# Patient Record
Sex: FEMALE | Race: WHITE | NOT HISPANIC OR LATINO | URBAN - METROPOLITAN AREA
[De-identification: names, ages, dates, MRNs, and addresses within clinical notes are randomized per-mention and may not be internally consistent; named-entity substitution may affect disease eponyms.]

---

## 2020-12-23 ENCOUNTER — OFFICE VISIT (OUTPATIENT)
Dept: URBAN - METROPOLITAN AREA CLINIC 113 | Facility: CLINIC | Age: 45
End: 2020-12-23

## 2021-01-21 ENCOUNTER — OFFICE VISIT (OUTPATIENT)
Dept: URBAN - METROPOLITAN AREA CLINIC 113 | Facility: CLINIC | Age: 46
End: 2021-01-21

## 2021-01-21 NOTE — HPI-TODAY'S VISIT:
This is a 45-year-old female with a history of anxiety, migraines, chronic back pain, and GERD referred from Dr. Davis for evaluation of nausea. EGD 9/25/2020 (Dr. 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  Biopsies of the antrum and GE junction were obtained.  Pathology report is unavailable.

## 2021-03-18 ENCOUNTER — OFFICE VISIT (OUTPATIENT)
Dept: URBAN - METROPOLITAN AREA CLINIC 113 | Facility: CLINIC | Age: 46
End: 2021-03-18

## 2021-04-09 ENCOUNTER — WEB ENCOUNTER (OUTPATIENT)
Dept: URBAN - METROPOLITAN AREA CLINIC 107 | Facility: CLINIC | Age: 46
End: 2021-04-09

## 2021-04-09 ENCOUNTER — OFFICE VISIT (OUTPATIENT)
Dept: URBAN - METROPOLITAN AREA CLINIC 107 | Facility: CLINIC | Age: 46
End: 2021-04-09
Payer: MEDICARE

## 2021-04-09 VITALS
SYSTOLIC BLOOD PRESSURE: 120 MMHG | BODY MASS INDEX: 36.65 KG/M2 | HEIGHT: 65 IN | DIASTOLIC BLOOD PRESSURE: 100 MMHG | RESPIRATION RATE: 20 BRPM | HEART RATE: 60 BPM | TEMPERATURE: 98.1 F | WEIGHT: 220 LBS

## 2021-04-09 DIAGNOSIS — K59.01 SLOW TRANSIT CONSTIPATION: ICD-10-CM

## 2021-04-09 DIAGNOSIS — R11.2 NON-INTRACTABLE VOMITING WITH NAUSEA, UNSPECIFIED VOMITING TYPE: ICD-10-CM

## 2021-04-09 DIAGNOSIS — R10.13 EPIGASTRIC PAIN: ICD-10-CM

## 2021-04-09 PROCEDURE — 99244 OFF/OP CNSLTJ NEW/EST MOD 40: CPT | Performed by: INTERNAL MEDICINE

## 2021-04-09 PROCEDURE — 99204 OFFICE O/P NEW MOD 45 MIN: CPT | Performed by: INTERNAL MEDICINE

## 2021-04-09 RX ORDER — OXYCODONE 18 MG/1
1 CAPSULE WITH FOOD CAPSULE, EXTENDED RELEASE ORAL
Status: ACTIVE | COMMUNITY

## 2021-04-09 RX ORDER — HYDROCODONE BITARTRATE AND ACETAMINOPHEN 10; 325 MG/1; MG/1
1 TABLET AS NEEDED TABLET ORAL
Status: ACTIVE | COMMUNITY

## 2021-04-09 RX ORDER — METOCLOPRAMIDE 10 MG/1
1 TABLET BEFORE MEALS TABLET ORAL TWICE A DAY
Status: ACTIVE | COMMUNITY

## 2021-04-09 RX ORDER — HYDROCORTISONE 25 MG/G
1 APPLICATION CREAM TOPICAL ONCE A DAY
Status: ACTIVE | COMMUNITY

## 2021-04-09 RX ORDER — URSODIOL 250 MG/1
1 TABLET WITH FOOD TABLET ORAL TWICE A DAY
Status: ACTIVE | COMMUNITY

## 2021-04-09 RX ORDER — CYCLOBENZAPRINE HYDROCHLORIDE 10 MG/1
1 TABLET AT BEDTIME AS NEEDED TABLET, FILM COATED ORAL ONCE A DAY
Status: ACTIVE | COMMUNITY

## 2021-04-09 RX ORDER — MONTELUKAST SODIUM 10 MG/1
1 TABLET TABLET, FILM COATED ORAL ONCE A DAY
Status: ACTIVE | COMMUNITY

## 2021-04-09 RX ORDER — PRUCALOPRIDE 2 MG/1
1 TABLET TABLET, FILM COATED ORAL ONCE A DAY
Qty: 30 | OUTPATIENT
Start: 2021-04-09 | End: 2021-05-09

## 2021-04-09 RX ORDER — TIZANIDINE 4 MG/1
1 TABLET AS NEEDED TABLET ORAL THREE TIMES A DAY
Status: ACTIVE | COMMUNITY

## 2021-04-09 RX ORDER — PANTOPRAZOLE SODIUM 40 MG/1
1 TABLET TABLET, DELAYED RELEASE ORAL ONCE A DAY
Status: ACTIVE | COMMUNITY

## 2021-04-09 RX ORDER — DIAZEPAM 10 MG/2ML
AS DIRECTED GEL RECTAL
Status: ACTIVE | COMMUNITY

## 2021-04-09 RX ORDER — GABAPENTIN 800 MG/1
1 TABLET TABLET, FILM COATED ORAL ONCE A DAY
Status: ACTIVE | COMMUNITY

## 2021-04-09 RX ORDER — FLUOCINONIDE 0.5 MG/G
1 APPLICATION CREAM TOPICAL TWICE A DAY
Status: ACTIVE | COMMUNITY

## 2021-04-09 RX ORDER — CHLORHEXIDINE GLUCONATE 1.2 MG/ML
AS DIRECTED RINSE ORAL
Status: ACTIVE | COMMUNITY

## 2021-04-09 RX ORDER — TOPIRAMATE 100 MG/1
1 CAPSULE CAPSULE, EXTENDED RELEASE ORAL ONCE A DAY
Status: ACTIVE | COMMUNITY

## 2021-04-09 RX ORDER — PROMETHAZINE HYDROCHLORIDE 12.5 MG/1
1 TABLET AS NEEDED TABLET ORAL
Status: ACTIVE | COMMUNITY

## 2021-04-09 RX ORDER — DICLOFENAC SODIUM TOPICAL GEL, 1% 10 MG/G
AS DIRECTED GEL TOPICAL
Status: ACTIVE | COMMUNITY

## 2021-04-09 RX ORDER — FUROSEMIDE 40 MG/1
1 TABLET TABLET ORAL ONCE A DAY
Status: ACTIVE | COMMUNITY

## 2021-04-09 RX ORDER — ROPINIROLE 4 MG/1
1 TABLET 1 TO 3 HOURS BEFORE BEDTIME TABLET, FILM COATED ORAL ONCE A DAY
Status: ACTIVE | COMMUNITY

## 2021-04-09 NOTE — HPI-TODAY'S VISIT:
The patient was referred by Dr. David Davis for nausea.   A copy of this document is being forwarded to the referring provider.  44 y/o female with nausea. She has postprandial bloating and pain. She had an upper endoscopy in 9/2020 which showed a hiatal hernia and reflux at the GEJ. Bx were negative. She had a normal GES in 5/2020. She does use chronic narcotics.  She has had GI issues for about 4 years. She was seen by Dr. Hobbs (which did her EGD). She was also evaluated by Dr. Scott in the past and had an EGD and GES/Barium swallow as well. This is how she eventually met Dr. Davis for evaluation of surgery.   Her symptoms are typically early satiety and nausea for the most part. She does sometimes have constipation and has a bowel movement every few days typically.  She has been on pain meds since about age [age].

## 2021-04-14 ENCOUNTER — TELEPHONE ENCOUNTER (OUTPATIENT)
Dept: URBAN - METROPOLITAN AREA CLINIC 113 | Facility: CLINIC | Age: 46
End: 2021-04-14

## 2021-06-08 ENCOUNTER — OFFICE VISIT (OUTPATIENT)
Dept: URBAN - METROPOLITAN AREA CLINIC 113 | Facility: CLINIC | Age: 46
End: 2021-06-08

## 2021-06-11 ENCOUNTER — OFFICE VISIT (OUTPATIENT)
Dept: URBAN - METROPOLITAN AREA CLINIC 113 | Facility: CLINIC | Age: 46
End: 2021-06-11

## 2021-06-15 ENCOUNTER — TELEPHONE ENCOUNTER (OUTPATIENT)
Dept: URBAN - METROPOLITAN AREA CLINIC 113 | Facility: CLINIC | Age: 46
End: 2021-06-15

## 2021-06-24 ENCOUNTER — WEB ENCOUNTER (OUTPATIENT)
Dept: URBAN - METROPOLITAN AREA CLINIC 113 | Facility: CLINIC | Age: 46
End: 2021-06-24

## 2021-06-24 ENCOUNTER — LAB OUTSIDE AN ENCOUNTER (OUTPATIENT)
Dept: URBAN - METROPOLITAN AREA CLINIC 113 | Facility: CLINIC | Age: 46
End: 2021-06-24

## 2021-06-24 ENCOUNTER — OFFICE VISIT (OUTPATIENT)
Dept: URBAN - METROPOLITAN AREA CLINIC 113 | Facility: CLINIC | Age: 46
End: 2021-06-24
Payer: MEDICARE

## 2021-06-24 VITALS
RESPIRATION RATE: 18 BRPM | HEIGHT: 65 IN | WEIGHT: 205 LBS | BODY MASS INDEX: 34.16 KG/M2 | DIASTOLIC BLOOD PRESSURE: 86 MMHG | SYSTOLIC BLOOD PRESSURE: 130 MMHG | HEART RATE: 79 BPM | TEMPERATURE: 97.7 F

## 2021-06-24 DIAGNOSIS — K59.01 SLOW TRANSIT CONSTIPATION: ICD-10-CM

## 2021-06-24 DIAGNOSIS — K21.9 GASTROESOPHAGEAL REFLUX DISEASE, UNSPECIFIED WHETHER ESOPHAGITIS PRESENT: ICD-10-CM

## 2021-06-24 DIAGNOSIS — R13.10 ESOPHAGEAL DYSPHAGIA: ICD-10-CM

## 2021-06-24 DIAGNOSIS — R10.13 EPIGASTRIC PAIN: ICD-10-CM

## 2021-06-24 DIAGNOSIS — R11.2 NON-INTRACTABLE VOMITING WITH NAUSEA, UNSPECIFIED VOMITING TYPE: ICD-10-CM

## 2021-06-24 DIAGNOSIS — K62.5 BRIGHT RED BLOOD PER RECTUM: ICD-10-CM

## 2021-06-24 PROCEDURE — 99214 OFFICE O/P EST MOD 30 MIN: CPT | Performed by: INTERNAL MEDICINE

## 2021-06-24 RX ORDER — TOPIRAMATE 100 MG/1
1 CAPSULE CAPSULE, EXTENDED RELEASE ORAL ONCE A DAY
Status: ACTIVE | COMMUNITY

## 2021-06-24 RX ORDER — DICLOFENAC SODIUM TOPICAL GEL, 1% 10 MG/G
AS DIRECTED GEL TOPICAL
Status: ACTIVE | COMMUNITY

## 2021-06-24 RX ORDER — LINACLOTIDE 145 UG/1
1 CAPSULE AT LEAST 30 MINUTES BEFORE THE FIRST MEAL OF THE DAY ON AN EMPTY STOMACH CAPSULE, GELATIN COATED ORAL ONCE A DAY
Qty: 30 | Refills: 3 | OUTPATIENT

## 2021-06-24 RX ORDER — ROPINIROLE 4 MG/1
1 TABLET 1 TO 3 HOURS BEFORE BEDTIME TABLET, FILM COATED ORAL ONCE A DAY
Status: ACTIVE | COMMUNITY

## 2021-06-24 RX ORDER — TIZANIDINE 4 MG/1
1 TABLET AS NEEDED TABLET ORAL THREE TIMES A DAY
Status: ON HOLD | COMMUNITY

## 2021-06-24 RX ORDER — HYDROCODONE BITARTRATE AND ACETAMINOPHEN 10; 325 MG/1; MG/1
1 TABLET AS NEEDED TABLET ORAL
Status: ACTIVE | COMMUNITY

## 2021-06-24 RX ORDER — HYDROCORTISONE 25 MG/G
1 APPLICATION CREAM TOPICAL ONCE A DAY
Status: ON HOLD | COMMUNITY

## 2021-06-24 RX ORDER — FUROSEMIDE 40 MG/1
1 TABLET TABLET ORAL ONCE A DAY
Status: ACTIVE | COMMUNITY

## 2021-06-24 RX ORDER — METOCLOPRAMIDE 10 MG/1
1 TABLET BEFORE MEALS TABLET ORAL TWICE A DAY
Status: ON HOLD | COMMUNITY

## 2021-06-24 RX ORDER — URSODIOL 250 MG/1
1 TABLET WITH FOOD TABLET ORAL TWICE A DAY
Status: ON HOLD | COMMUNITY

## 2021-06-24 RX ORDER — CYCLOBENZAPRINE HYDROCHLORIDE 10 MG/1
1 TABLET AT BEDTIME AS NEEDED TABLET, FILM COATED ORAL ONCE A DAY
Status: ACTIVE | COMMUNITY

## 2021-06-24 RX ORDER — BUSPIRONE HYDROCHLORIDE 5 MG/1
1 TABLET TABLET ORAL TWICE A DAY
Qty: 120 TABLET | Refills: 3 | OUTPATIENT

## 2021-06-24 RX ORDER — FLUOCINONIDE 0.5 MG/G
1 APPLICATION CREAM TOPICAL TWICE A DAY
Status: ACTIVE | COMMUNITY

## 2021-06-24 RX ORDER — PROMETHAZINE HYDROCHLORIDE 12.5 MG/1
1 TABLET AS NEEDED TABLET ORAL
Qty: 30 | Refills: 1

## 2021-06-24 RX ORDER — SODIUM, POTASSIUM,MAG SULFATES 17.5-3.13G
354 ML SOLUTION, RECONSTITUTED, ORAL ORAL
Qty: 354 MILLILITER | Refills: 0 | OUTPATIENT

## 2021-06-24 RX ORDER — PROMETHAZINE HYDROCHLORIDE 12.5 MG/1
1 TABLET AS NEEDED TABLET ORAL
Status: ACTIVE | COMMUNITY

## 2021-06-24 RX ORDER — GABAPENTIN 800 MG/1
1 TABLET TABLET, FILM COATED ORAL ONCE A DAY
Status: ACTIVE | COMMUNITY

## 2021-06-24 RX ORDER — PANTOPRAZOLE SODIUM 40 MG/1
1 TABLET TABLET, DELAYED RELEASE ORAL ONCE A DAY
Status: ACTIVE | COMMUNITY

## 2021-06-24 RX ORDER — DIAZEPAM 10 MG/2ML
AS DIRECTED GEL RECTAL
Status: ON HOLD | COMMUNITY

## 2021-06-24 RX ORDER — OXYCODONE 18 MG/1
1 CAPSULE WITH FOOD CAPSULE, EXTENDED RELEASE ORAL
Status: ACTIVE | COMMUNITY

## 2021-06-24 RX ORDER — MONTELUKAST SODIUM 10 MG/1
1 TABLET TABLET, FILM COATED ORAL ONCE A DAY
Status: ACTIVE | COMMUNITY

## 2021-06-24 RX ORDER — CHLORHEXIDINE GLUCONATE 1.2 MG/ML
AS DIRECTED RINSE ORAL
Status: ON HOLD | COMMUNITY

## 2021-06-24 NOTE — HPI-TODAY'S VISIT:
This is a 45-year-old female with a history of anxiety, migraines, chronic back pain on opioids since 2014, nausea/vomiting, and constipation presenting for follow-up complaining of stomach issues and inability to eat solid food. She was originally seen 4/9/2021.  She had been referred from Dr. Davis for evaluation of nausea.  She also reported postprandial bloating and pain.  She describes early satiety and reported constipation having a bowel movement every few days.  She had undergone an EGD in September 2020 which showed a hiatal hernia and reflux at the GE junction with negative biopsies.  She had a normal gastric emptying study in May 2020.  She reported symptoms occurring for 4 years.  She had been under the care of Dr. Hobbs.  She had also been evaluated by Dr. Scott.  It was discussed that her symptoms were likely secondary to opioid therapy.  A small bowel follow-through was ordered and she was prescribed Motegrity.  She has experienced abdominal symptoms since 2016.  She has been disabled since age 25 due to back pain requiring surgery.  Except for acid reflux, she was asymptomatic from a GI standpoint prior to 2016.  She tried taking Motegrity and reports that she vomited "orange liquid" and discontinued the medication.  She is taking Protonix daily.  Acid reflux is controlled.  She describes oropharyngeal dysphagia as well as distal esophageal dysphagia relieved with drinking water.  She has frequent pain indicated in the epigastrium.  She describes the pain begins as burning in her intestines (indicating the left lower quadrant) radiating into her stomach (indicating the left upper quadrant and epigastrium).  She has frequent nausea for which she is requiring promethazine.  Ondansetron has been less effective.  When she vomits, she produces food or will "dry heaves."  She occasionally has soreness in her ribs associated with retching.  She denies hematemesis.  She admits occasionally smoking marijuana in order to relieve her symptoms and states it is effective when used. She occasionally has  as many as 7 days without a bowel movement.  This usually occurs when she is not eating.  When she is eating, she has 2 or 3 bowel movements per week.  She may have hard stools or may have watery diarrhea.  She reports occasional small-volume red blood per rectum.  She has tried Colace and daily MiraLAX for constipation.  She had vomiting associated with taking MiraLAX.  She has not tried other medications for chronic constipation. She states she had less symptoms when she was taking long-acting morphine.  She reports more symptoms since her medication was changed to Xtampza BID.  She requires hydrocodone once a day. She is complaining of other symptoms including lack of energy, lack of strength, dysgeusia, and weakness. She reports multiple gastric emptying studies, an EGD a few years ago with Dr. Hobbs and an EGD in early 2021 with Dr. Scott (surgeon in [place]), multiple CT scans, barium swallows at [facility].

## 2021-06-24 NOTE — HPI-OTHER HISTORIES
Labs 6/2/2021:CANDIS negative.  TTG negative.  TSH 1.48.  Free T4 1.42. Small bowel follow-through 4/13/2021:Slightly delayed transit time otherwise normal small bowel follow-through.

## 2021-06-24 NOTE — PHYSICAL EXAM HENT:
Head,  normocephalic,  atraumatic. Wearing a face mask , Head,  normocephalic,  atraumatic. Wearing a face mask

## 2021-06-25 ENCOUNTER — TELEPHONE ENCOUNTER (OUTPATIENT)
Dept: URBAN - METROPOLITAN AREA CLINIC 113 | Facility: CLINIC | Age: 46
End: 2021-06-25

## 2021-06-25 RX ORDER — SODIUM, POTASSIUM,MAG SULFATES 17.5-3.13G
354 ML SOLUTION, RECONSTITUTED, ORAL ORAL ONCE
Qty: 354 MILLILITER | Refills: 3 | OUTPATIENT

## 2021-07-08 ENCOUNTER — OFFICE VISIT (OUTPATIENT)
Dept: URBAN - METROPOLITAN AREA MEDICAL CENTER 19 | Facility: MEDICAL CENTER | Age: 46
End: 2021-07-08
Payer: MEDICARE

## 2021-07-08 DIAGNOSIS — K22.8 COLUMNAR-LINED ESOPHAGUS: ICD-10-CM

## 2021-07-08 DIAGNOSIS — K62.5 ANAL HEMORRHAGE: ICD-10-CM

## 2021-07-08 DIAGNOSIS — K31.89 ACQUIRED DEFORMITY OF PYLORUS: ICD-10-CM

## 2021-07-08 DIAGNOSIS — R11.2 AMPHOTERICIN-INDUCED NAUSEA AND VOMITING: ICD-10-CM

## 2021-07-08 DIAGNOSIS — R13.19 ESOPHAGEAL DYSPHAGIA: ICD-10-CM

## 2021-07-08 DIAGNOSIS — R10.13 ABDOMINAL PAIN, ACUTE, EPIGASTRIC: ICD-10-CM

## 2021-07-08 DIAGNOSIS — K64.1 GRADE II HEMORRHOIDS: ICD-10-CM

## 2021-07-08 PROCEDURE — 45378 DIAGNOSTIC COLONOSCOPY: CPT | Performed by: INTERNAL MEDICINE

## 2021-07-08 PROCEDURE — 43239 EGD BIOPSY SINGLE/MULTIPLE: CPT | Performed by: INTERNAL MEDICINE

## 2021-07-08 RX ORDER — METOCLOPRAMIDE 10 MG/1
1 TABLET BEFORE MEALS TABLET ORAL TWICE A DAY
Status: ON HOLD | COMMUNITY

## 2021-07-08 RX ORDER — ROPINIROLE 4 MG/1
1 TABLET 1 TO 3 HOURS BEFORE BEDTIME TABLET, FILM COATED ORAL ONCE A DAY
Status: ACTIVE | COMMUNITY

## 2021-07-08 RX ORDER — BUSPIRONE HYDROCHLORIDE 5 MG/1
1 TABLET TABLET ORAL TWICE A DAY
Qty: 120 TABLET | Refills: 3 | Status: ACTIVE | COMMUNITY

## 2021-07-08 RX ORDER — DIAZEPAM 10 MG/2ML
AS DIRECTED GEL RECTAL
Status: ON HOLD | COMMUNITY

## 2021-07-08 RX ORDER — CYCLOBENZAPRINE HYDROCHLORIDE 10 MG/1
1 TABLET AT BEDTIME AS NEEDED TABLET, FILM COATED ORAL ONCE A DAY
Status: ACTIVE | COMMUNITY

## 2021-07-08 RX ORDER — HYDROCODONE BITARTRATE AND ACETAMINOPHEN 10; 325 MG/1; MG/1
1 TABLET AS NEEDED TABLET ORAL
Status: ACTIVE | COMMUNITY

## 2021-07-08 RX ORDER — SODIUM, POTASSIUM,MAG SULFATES 17.5-3.13G
354 ML SOLUTION, RECONSTITUTED, ORAL ORAL
Qty: 354 MILLILITER | Refills: 0 | Status: ACTIVE | COMMUNITY

## 2021-07-08 RX ORDER — HYDROCORTISONE 25 MG/G
1 APPLICATION CREAM TOPICAL ONCE A DAY
Status: ON HOLD | COMMUNITY

## 2021-07-08 RX ORDER — PANTOPRAZOLE SODIUM 40 MG/1
1 TABLET TABLET, DELAYED RELEASE ORAL ONCE A DAY
Status: ACTIVE | COMMUNITY

## 2021-07-08 RX ORDER — SODIUM, POTASSIUM,MAG SULFATES 17.5-3.13G
354 ML SOLUTION, RECONSTITUTED, ORAL ORAL ONCE
Qty: 354 MILLILITER | Refills: 3 | Status: ACTIVE | COMMUNITY

## 2021-07-08 RX ORDER — TOPIRAMATE 100 MG/1
1 CAPSULE CAPSULE, EXTENDED RELEASE ORAL ONCE A DAY
Status: ACTIVE | COMMUNITY

## 2021-07-08 RX ORDER — GABAPENTIN 800 MG/1
1 TABLET TABLET, FILM COATED ORAL ONCE A DAY
Status: ACTIVE | COMMUNITY

## 2021-07-08 RX ORDER — OXYCODONE 18 MG/1
1 CAPSULE WITH FOOD CAPSULE, EXTENDED RELEASE ORAL
Status: ACTIVE | COMMUNITY

## 2021-07-08 RX ORDER — FUROSEMIDE 40 MG/1
1 TABLET TABLET ORAL ONCE A DAY
Status: ACTIVE | COMMUNITY

## 2021-07-08 RX ORDER — LINACLOTIDE 145 UG/1
1 CAPSULE AT LEAST 30 MINUTES BEFORE THE FIRST MEAL OF THE DAY ON AN EMPTY STOMACH CAPSULE, GELATIN COATED ORAL ONCE A DAY
Qty: 30 | Refills: 3 | Status: ACTIVE | COMMUNITY

## 2021-07-08 RX ORDER — URSODIOL 250 MG/1
1 TABLET WITH FOOD TABLET ORAL TWICE A DAY
Status: ON HOLD | COMMUNITY

## 2021-07-08 RX ORDER — TIZANIDINE 4 MG/1
1 TABLET AS NEEDED TABLET ORAL THREE TIMES A DAY
Status: ON HOLD | COMMUNITY

## 2021-07-08 RX ORDER — CHLORHEXIDINE GLUCONATE 1.2 MG/ML
AS DIRECTED RINSE ORAL
Status: ON HOLD | COMMUNITY

## 2021-07-08 RX ORDER — DICLOFENAC SODIUM TOPICAL GEL, 1% 10 MG/G
AS DIRECTED GEL TOPICAL
Status: ACTIVE | COMMUNITY

## 2021-07-08 RX ORDER — PROMETHAZINE HYDROCHLORIDE 12.5 MG/1
1 TABLET AS NEEDED TABLET ORAL
Qty: 30 | Refills: 1 | Status: ACTIVE | COMMUNITY

## 2021-07-08 RX ORDER — MONTELUKAST SODIUM 10 MG/1
1 TABLET TABLET, FILM COATED ORAL ONCE A DAY
Status: ACTIVE | COMMUNITY

## 2021-07-08 RX ORDER — FLUOCINONIDE 0.5 MG/G
1 APPLICATION CREAM TOPICAL TWICE A DAY
Status: ACTIVE | COMMUNITY

## 2021-07-14 ENCOUNTER — TELEPHONE ENCOUNTER (OUTPATIENT)
Dept: URBAN - METROPOLITAN AREA CLINIC 113 | Facility: CLINIC | Age: 46
End: 2021-07-14

## 2021-08-10 ENCOUNTER — OFFICE VISIT (OUTPATIENT)
Dept: URBAN - METROPOLITAN AREA CLINIC 113 | Facility: CLINIC | Age: 46
End: 2021-08-10
Payer: MEDICARE

## 2021-08-10 VITALS
BODY MASS INDEX: 33.32 KG/M2 | WEIGHT: 200 LBS | HEIGHT: 65 IN | TEMPERATURE: 97.8 F | RESPIRATION RATE: 20 BRPM | HEART RATE: 76 BPM | DIASTOLIC BLOOD PRESSURE: 97 MMHG | SYSTOLIC BLOOD PRESSURE: 151 MMHG

## 2021-08-10 DIAGNOSIS — R10.13 EPIGASTRIC PAIN: ICD-10-CM

## 2021-08-10 DIAGNOSIS — R11.2 NON-INTRACTABLE VOMITING WITH NAUSEA, UNSPECIFIED VOMITING TYPE: ICD-10-CM

## 2021-08-10 DIAGNOSIS — R13.10 ESOPHAGEAL DYSPHAGIA: ICD-10-CM

## 2021-08-10 DIAGNOSIS — K21.9 GASTROESOPHAGEAL REFLUX DISEASE, UNSPECIFIED WHETHER ESOPHAGITIS PRESENT: ICD-10-CM

## 2021-08-10 DIAGNOSIS — K59.01 SLOW TRANSIT CONSTIPATION: ICD-10-CM

## 2021-08-10 DIAGNOSIS — K62.5 BRIGHT RED BLOOD PER RECTUM: ICD-10-CM

## 2021-08-10 DIAGNOSIS — R19.7 DIARRHEA OF PRESUMED INFECTIOUS ORIGIN: ICD-10-CM

## 2021-08-10 PROBLEM — 74474003: Status: ACTIVE | Noted: 2021-06-24

## 2021-08-10 PROBLEM — 79922009: Status: ACTIVE | Noted: 2021-04-09

## 2021-08-10 PROBLEM — 16932000: Status: ACTIVE | Noted: 2021-04-09

## 2021-08-10 PROBLEM — 40890009: Status: ACTIVE | Noted: 2021-06-24

## 2021-08-10 PROCEDURE — 99214 OFFICE O/P EST MOD 30 MIN: CPT | Performed by: INTERNAL MEDICINE

## 2021-08-10 RX ORDER — SODIUM, POTASSIUM,MAG SULFATES 17.5-3.13G
354 ML SOLUTION, RECONSTITUTED, ORAL ORAL
Qty: 354 MILLILITER | Refills: 0 | Status: ON HOLD | COMMUNITY

## 2021-08-10 RX ORDER — CHLORHEXIDINE GLUCONATE 1.2 MG/ML
AS DIRECTED RINSE ORAL
Status: ON HOLD | COMMUNITY

## 2021-08-10 RX ORDER — GABAPENTIN 800 MG/1
1 TABLET TABLET, FILM COATED ORAL ONCE A DAY
Status: ACTIVE | COMMUNITY

## 2021-08-10 RX ORDER — URSODIOL 250 MG/1
1 TABLET WITH FOOD TABLET ORAL TWICE A DAY
Status: ON HOLD | COMMUNITY

## 2021-08-10 RX ORDER — METOCLOPRAMIDE 10 MG/1
1 TABLET BEFORE MEALS TABLET ORAL TWICE A DAY
Status: ON HOLD | COMMUNITY

## 2021-08-10 RX ORDER — TIZANIDINE 4 MG/1
1 TABLET AS NEEDED TABLET ORAL THREE TIMES A DAY
Status: ON HOLD | COMMUNITY

## 2021-08-10 RX ORDER — FUROSEMIDE 40 MG/1
1 TABLET TABLET ORAL ONCE A DAY
Status: ACTIVE | COMMUNITY

## 2021-08-10 RX ORDER — BUSPIRONE HYDROCHLORIDE 5 MG/1
1 TABLET TABLET ORAL TWICE A DAY
Qty: 120 TABLET | Refills: 3 | Status: ACTIVE | COMMUNITY

## 2021-08-10 RX ORDER — HYDROCORTISONE 25 MG/G
1 APPLICATION CREAM TOPICAL ONCE A DAY
Status: ON HOLD | COMMUNITY

## 2021-08-10 RX ORDER — BUSPIRONE HYDROCHLORIDE 10 MG/1
1 TABLET TABLET ORAL TWICE A DAY
Qty: 120 TABLET | Refills: 1 | OUTPATIENT

## 2021-08-10 RX ORDER — LINACLOTIDE 145 UG/1
1 CAPSULE AT LEAST 30 MINUTES BEFORE THE FIRST MEAL OF THE DAY ON AN EMPTY STOMACH CAPSULE, GELATIN COATED ORAL ONCE A DAY
Qty: 30 | Refills: 3 | Status: ACTIVE | COMMUNITY

## 2021-08-10 RX ORDER — HYDROCODONE BITARTRATE AND ACETAMINOPHEN 10; 325 MG/1; MG/1
1 TABLET AS NEEDED TABLET ORAL
Status: ACTIVE | COMMUNITY

## 2021-08-10 RX ORDER — SODIUM, POTASSIUM,MAG SULFATES 17.5-3.13G
354 ML SOLUTION, RECONSTITUTED, ORAL ORAL ONCE
Qty: 354 MILLILITER | Refills: 3 | Status: ON HOLD | COMMUNITY

## 2021-08-10 RX ORDER — MONTELUKAST SODIUM 10 MG/1
1 TABLET TABLET, FILM COATED ORAL ONCE A DAY
Status: ACTIVE | COMMUNITY

## 2021-08-10 RX ORDER — FLUOCINONIDE 0.5 MG/G
1 APPLICATION CREAM TOPICAL TWICE A DAY
Status: ACTIVE | COMMUNITY

## 2021-08-10 RX ORDER — TOPIRAMATE 100 MG/1
1 CAPSULE CAPSULE, EXTENDED RELEASE ORAL ONCE A DAY
Status: ACTIVE | COMMUNITY

## 2021-08-10 RX ORDER — ROPINIROLE 4 MG/1
1 TABLET 1 TO 3 HOURS BEFORE BEDTIME TABLET, FILM COATED ORAL ONCE A DAY
Status: ACTIVE | COMMUNITY

## 2021-08-10 RX ORDER — CYCLOBENZAPRINE HYDROCHLORIDE 10 MG/1
1 TABLET AT BEDTIME AS NEEDED TABLET, FILM COATED ORAL ONCE A DAY
Status: ACTIVE | COMMUNITY

## 2021-08-10 RX ORDER — PROMETHAZINE HYDROCHLORIDE 12.5 MG/1
1 TABLET AS NEEDED TABLET ORAL
Qty: 30 | Refills: 1 | Status: ACTIVE | COMMUNITY

## 2021-08-10 RX ORDER — DIAZEPAM 10 MG/2ML
AS DIRECTED GEL RECTAL
Status: ON HOLD | COMMUNITY

## 2021-08-10 RX ORDER — PANTOPRAZOLE SODIUM 40 MG/1
1 TABLET TABLET, DELAYED RELEASE ORAL ONCE A DAY
Status: ACTIVE | COMMUNITY

## 2021-08-10 RX ORDER — OXYCODONE 18 MG/1
1 CAPSULE WITH FOOD CAPSULE, EXTENDED RELEASE ORAL
Status: ACTIVE | COMMUNITY

## 2021-08-10 RX ORDER — DICLOFENAC SODIUM TOPICAL GEL, 1% 10 MG/G
AS DIRECTED GEL TOPICAL
Status: ACTIVE | COMMUNITY

## 2021-08-10 NOTE — HPI-TODAY'S VISIT:
45-year-old female presenting for follow-up.  She was last seen in clinic on June 24, 2021. She has since had a colonoscopy performed and an upper endoscopy performed on July 8, 2021. Aside from nonbleeding internal hemorrhoids her colonoscopy was normal. Biopsies were negative for intestinal metaplasia or H. pylori.  She was concerned that she had intestinal paracites in her stool. We did order some stool studies but she did not have them performed. She bhargavi continue to have nausea, emesis, and abdominal pain. We placed her on Buspar 5mg BID and she is unsure if this is helping thus far.   6/24/21 This is a 45-year-old female with a history of anxiety, migraines, chronic back pain on opioids since 2014, nausea/vomiting, and constipation presenting for follow-up complaining of stomach issues and inability to eat solid food. She was originally seen 4/9/2021.  She had been referred from Dr. Davis for evaluation of nausea.  She also reported postprandial bloating and pain.  She describes early satiety and reported constipation having a bowel movement every few days.  She had undergone an EGD in September 2020 which showed a hiatal hernia and reflux at the GE junction with negative biopsies.  She had a normal gastric emptying study in May 2020.  She reported symptoms occurring for 4 years.  She had been under the care of Dr. Hobbs.  She had also been evaluated by Dr. Scott.  It was discussed that her symptoms were likely secondary to opioid therapy.  A small bowel follow-through was ordered and she was prescribed Motegrity.  She has experienced abdominal symptoms since 2016.  She has been disabled since age 25 due to back pain requiring surgery.  Except for acid reflux, she was asymptomatic from a GI standpoint prior to 2016.  She tried taking Motegrity and reports that she vomited "orange liquid" and discontinued the medication.  She is taking Protonix daily.  Acid reflux is controlled.  She describes oropharyngeal dysphagia as well as distal esophageal dysphagia relieved with drinking water.  She has frequent pain indicated in the epigastrium.  She describes the pain begins as burning in her intestines (indicating the left lower quadrant) radiating into her stomach (indicating the left upper quadrant and epigastrium).  She has frequent nausea for which she is requiring promethazine.  Ondansetron has been less effective.  When she vomits, she produces food or will "dry heaves."  She occasionally has soreness in her ribs associated with retching.  She denies hematemesis.  She admits occasionally smoking marijuana in order to relieve her symptoms and states it is effective when used. She occasionally has  as many as 7 days without a bowel movement.  This usually occurs when she is not eating.  When she is eating, she has 2 or 3 bowel movements per week.  She may have hard stools or may have watery diarrhea.  She reports occasional small-volume red blood per rectum.  She has tried Colace and daily MiraLAX for constipation.  She had vomiting associated with taking MiraLAX.  She has not tried other medications for chronic constipation. She states she had less symptoms when she was taking long-acting morphine.  She reports more symptoms since her medication was changed to Xtampza BID.  She requires hydrocodone once a day. She is complaining of other symptoms including lack of energy, lack of strength, dysgeusia, and weakness. She reports multiple gastric emptying studies, an EGD a few years ago with Dr. Hobbs and an EGD in early 2021 with Dr. Scott (surgeon in [place]), multiple CT scans, barium swallows at [facility]. The patient was referred by Dr. David Davis for nausea.   A copy of this document is being forwarded to the referring provider.  46 y/o female with nausea. She has postprandial bloating and pain. She had an upper endoscopy in 9/2020 which showed a hiatal hernia and reflux at the GEJ. Bx were negative. She had a normal GES in 5/2020. She does use chronic narcotics.  She has had GI issues for about 4 years. She was seen by Dr. Hobbs (which did her EGD). She was also evaluated by Dr. Scott in the past and had an EGD and GES/Barium swallow as well. This is how she eventually met Dr. Davis for evaluation of surgery.   Her symptoms are typically early satiety and nausea for the most part. She does sometimes have constipation and has a bowel movement every few days typically.  She has been on pain meds since about age [age].

## 2021-08-18 ENCOUNTER — TELEPHONE ENCOUNTER (OUTPATIENT)
Dept: URBAN - METROPOLITAN AREA CLINIC 113 | Facility: CLINIC | Age: 46
End: 2021-08-18

## 2021-08-25 PROBLEM — 35298007: Status: ACTIVE | Noted: 2021-04-09

## 2021-10-08 ENCOUNTER — OFFICE VISIT (OUTPATIENT)
Dept: URBAN - METROPOLITAN AREA CLINIC 113 | Facility: CLINIC | Age: 46
End: 2021-10-08

## 2021-10-08 ENCOUNTER — TELEPHONE ENCOUNTER (OUTPATIENT)
Dept: URBAN - METROPOLITAN AREA CLINIC 113 | Facility: CLINIC | Age: 46
End: 2021-10-08

## 2021-12-02 ENCOUNTER — OFFICE VISIT (OUTPATIENT)
Dept: URBAN - METROPOLITAN AREA CLINIC 113 | Facility: CLINIC | Age: 46
End: 2021-12-02

## 2021-12-02 ENCOUNTER — DASHBOARD ENCOUNTERS (OUTPATIENT)
Age: 46
End: 2021-12-02

## 2021-12-02 RX ORDER — PANTOPRAZOLE SODIUM 40 MG/1
1 TABLET TABLET, DELAYED RELEASE ORAL ONCE A DAY
Status: ACTIVE | COMMUNITY

## 2021-12-02 RX ORDER — MONTELUKAST SODIUM 10 MG/1
1 TABLET TABLET, FILM COATED ORAL ONCE A DAY
Status: ACTIVE | COMMUNITY

## 2021-12-02 RX ORDER — CHLORHEXIDINE GLUCONATE 1.2 MG/ML
AS DIRECTED RINSE ORAL
Status: ON HOLD | COMMUNITY

## 2021-12-02 RX ORDER — HYDROCORTISONE 25 MG/G
1 APPLICATION CREAM TOPICAL ONCE A DAY
Status: ON HOLD | COMMUNITY

## 2021-12-02 RX ORDER — ROPINIROLE 4 MG/1
1 TABLET 1 TO 3 HOURS BEFORE BEDTIME TABLET, FILM COATED ORAL ONCE A DAY
Status: ACTIVE | COMMUNITY

## 2021-12-02 RX ORDER — FLUOCINONIDE 0.5 MG/G
1 APPLICATION CREAM TOPICAL TWICE A DAY
Status: ACTIVE | COMMUNITY

## 2021-12-02 RX ORDER — METOCLOPRAMIDE 10 MG/1
1 TABLET BEFORE MEALS TABLET ORAL TWICE A DAY
Status: ON HOLD | COMMUNITY

## 2021-12-02 RX ORDER — HYDROCODONE BITARTRATE AND ACETAMINOPHEN 10; 325 MG/1; MG/1
1 TABLET AS NEEDED TABLET ORAL
Status: ACTIVE | COMMUNITY

## 2021-12-02 RX ORDER — PROMETHAZINE HYDROCHLORIDE 12.5 MG/1
1 TABLET AS NEEDED TABLET ORAL
Qty: 30 | Refills: 1 | Status: ACTIVE | COMMUNITY

## 2021-12-02 RX ORDER — SODIUM, POTASSIUM,MAG SULFATES 17.5-3.13G
354 ML SOLUTION, RECONSTITUTED, ORAL ORAL
Qty: 354 MILLILITER | Refills: 0 | Status: ON HOLD | COMMUNITY

## 2021-12-02 RX ORDER — CYCLOBENZAPRINE HYDROCHLORIDE 10 MG/1
1 TABLET AT BEDTIME AS NEEDED TABLET, FILM COATED ORAL ONCE A DAY
Status: ACTIVE | COMMUNITY

## 2021-12-02 RX ORDER — OXYCODONE 18 MG/1
1 CAPSULE WITH FOOD CAPSULE, EXTENDED RELEASE ORAL
Status: ACTIVE | COMMUNITY

## 2021-12-02 RX ORDER — URSODIOL 250 MG/1
1 TABLET WITH FOOD TABLET ORAL TWICE A DAY
Status: ON HOLD | COMMUNITY

## 2021-12-02 RX ORDER — SODIUM, POTASSIUM,MAG SULFATES 17.5-3.13G
354 ML SOLUTION, RECONSTITUTED, ORAL ORAL ONCE
Qty: 354 MILLILITER | Refills: 3 | Status: ON HOLD | COMMUNITY

## 2021-12-02 RX ORDER — FUROSEMIDE 40 MG/1
1 TABLET TABLET ORAL ONCE A DAY
Status: ACTIVE | COMMUNITY

## 2021-12-02 RX ORDER — TOPIRAMATE 100 MG/1
1 CAPSULE CAPSULE, EXTENDED RELEASE ORAL ONCE A DAY
Status: ACTIVE | COMMUNITY

## 2021-12-02 RX ORDER — DICLOFENAC SODIUM TOPICAL GEL, 1% 10 MG/G
AS DIRECTED GEL TOPICAL
Status: ACTIVE | COMMUNITY

## 2021-12-02 RX ORDER — GABAPENTIN 800 MG/1
1 TABLET TABLET, FILM COATED ORAL ONCE A DAY
Status: ACTIVE | COMMUNITY

## 2021-12-02 RX ORDER — DIAZEPAM 10 MG/2ML
AS DIRECTED GEL RECTAL
Status: ON HOLD | COMMUNITY

## 2021-12-02 RX ORDER — LINACLOTIDE 145 UG/1
1 CAPSULE AT LEAST 30 MINUTES BEFORE THE FIRST MEAL OF THE DAY ON AN EMPTY STOMACH CAPSULE, GELATIN COATED ORAL ONCE A DAY
Qty: 30 | Refills: 3 | Status: ACTIVE | COMMUNITY

## 2021-12-02 RX ORDER — TIZANIDINE 4 MG/1
1 TABLET AS NEEDED TABLET ORAL THREE TIMES A DAY
Status: ON HOLD | COMMUNITY

## 2021-12-02 RX ORDER — BUSPIRONE HYDROCHLORIDE 10 MG/1
1 TABLET TABLET ORAL TWICE A DAY
Qty: 120 TABLET | Refills: 1 | Status: ACTIVE | COMMUNITY

## 2022-06-12 ENCOUNTER — ERX REFILL RESPONSE (OUTPATIENT)
Dept: URBAN - METROPOLITAN AREA CLINIC 113 | Facility: CLINIC | Age: 47
End: 2022-06-12

## 2022-06-12 RX ORDER — LINACLOTIDE 145 UG/1
TAKE 1 CAPSULE DAILY 30 MINUTES BEFORE BREAKFAST ON AN EMPTY STOMACH CAPSULE, GELATIN COATED ORAL
Qty: 30 EACH | Refills: 2 | OUTPATIENT

## 2022-06-12 RX ORDER — LINACLOTIDE 145 UG/1
1 CAPSULE AT LEAST 30 MINUTES BEFORE THE FIRST MEAL OF THE DAY ON AN EMPTY STOMACH CAPSULE, GELATIN COATED ORAL ONCE A DAY
Qty: 30 | Refills: 3 | OUTPATIENT

## 2023-07-21 ENCOUNTER — TELEPHONE ENCOUNTER (OUTPATIENT)
Dept: URBAN - METROPOLITAN AREA CLINIC 113 | Facility: CLINIC | Age: 48
End: 2023-07-21

## 2023-07-26 ENCOUNTER — TELEPHONE ENCOUNTER (OUTPATIENT)
Dept: URBAN - METROPOLITAN AREA CLINIC 113 | Facility: CLINIC | Age: 48
End: 2023-07-26

## 2023-11-29 ENCOUNTER — OFFICE VISIT (OUTPATIENT)
Dept: URBAN - METROPOLITAN AREA CLINIC 113 | Facility: CLINIC | Age: 48
End: 2023-11-29

## 2023-11-29 RX ORDER — HYDROCODONE BITARTRATE AND ACETAMINOPHEN 10; 325 MG/1; MG/1
1 TABLET AS NEEDED TABLET ORAL
Status: ACTIVE | COMMUNITY

## 2023-11-29 RX ORDER — TIZANIDINE 4 MG/1
1 TABLET AS NEEDED TABLET ORAL THREE TIMES A DAY
Status: ON HOLD | COMMUNITY

## 2023-11-29 RX ORDER — FUROSEMIDE 40 MG/1
1 TABLET TABLET ORAL ONCE A DAY
Status: ACTIVE | COMMUNITY

## 2023-11-29 RX ORDER — MONTELUKAST SODIUM 10 MG/1
1 TABLET TABLET, FILM COATED ORAL ONCE A DAY
Status: ACTIVE | COMMUNITY

## 2023-11-29 RX ORDER — OXYCODONE 18 MG/1
1 CAPSULE WITH FOOD CAPSULE, EXTENDED RELEASE ORAL
Status: ACTIVE | COMMUNITY

## 2023-11-29 RX ORDER — SODIUM, POTASSIUM,MAG SULFATES 17.5-3.13G
354 ML SOLUTION, RECONSTITUTED, ORAL ORAL
Qty: 354 MILLILITER | Refills: 0 | Status: ON HOLD | COMMUNITY

## 2023-11-29 RX ORDER — LINACLOTIDE 145 UG/1
TAKE 1 CAPSULE DAILY 30 MINUTES BEFORE BREAKFAST ON AN EMPTY STOMACH CAPSULE, GELATIN COATED ORAL
Qty: 30 EACH | Refills: 2 | Status: ACTIVE | COMMUNITY

## 2023-11-29 RX ORDER — DIAZEPAM 10 MG/2ML
AS DIRECTED GEL RECTAL
Status: ON HOLD | COMMUNITY

## 2023-11-29 RX ORDER — ROPINIROLE 4 MG/1
1 TABLET 1 TO 3 HOURS BEFORE BEDTIME TABLET, FILM COATED ORAL ONCE A DAY
Status: ACTIVE | COMMUNITY

## 2023-11-29 RX ORDER — GABAPENTIN 800 MG/1
1 TABLET TABLET, FILM COATED ORAL ONCE A DAY
Status: ACTIVE | COMMUNITY

## 2023-11-29 RX ORDER — TOPIRAMATE 100 MG/1
1 CAPSULE CAPSULE, EXTENDED RELEASE ORAL ONCE A DAY
Status: ACTIVE | COMMUNITY

## 2023-11-29 RX ORDER — PROMETHAZINE HYDROCHLORIDE 12.5 MG/1
1 TABLET AS NEEDED TABLET ORAL
Qty: 30 | Refills: 1 | Status: ACTIVE | COMMUNITY

## 2023-11-29 RX ORDER — SODIUM, POTASSIUM,MAG SULFATES 17.5-3.13G
354 ML SOLUTION, RECONSTITUTED, ORAL ORAL ONCE
Qty: 354 MILLILITER | Refills: 3 | Status: ON HOLD | COMMUNITY

## 2023-11-29 RX ORDER — CYCLOBENZAPRINE HYDROCHLORIDE 10 MG/1
1 TABLET AT BEDTIME AS NEEDED TABLET, FILM COATED ORAL ONCE A DAY
Status: ACTIVE | COMMUNITY

## 2023-11-29 RX ORDER — DICLOFENAC SODIUM TOPICAL GEL, 1% 10 MG/G
AS DIRECTED GEL TOPICAL
Status: ACTIVE | COMMUNITY

## 2023-11-29 RX ORDER — BUSPIRONE HYDROCHLORIDE 10 MG/1
1 TABLET TABLET ORAL TWICE A DAY
Qty: 120 TABLET | Refills: 1 | Status: ACTIVE | COMMUNITY

## 2023-11-29 RX ORDER — FLUOCINONIDE 0.5 MG/G
1 APPLICATION CREAM TOPICAL TWICE A DAY
Status: ACTIVE | COMMUNITY

## 2023-11-29 RX ORDER — HYDROCORTISONE 25 MG/G
1 APPLICATION CREAM TOPICAL ONCE A DAY
Status: ON HOLD | COMMUNITY

## 2023-11-29 RX ORDER — PANTOPRAZOLE SODIUM 40 MG/1
1 TABLET TABLET, DELAYED RELEASE ORAL ONCE A DAY
Status: ACTIVE | COMMUNITY

## 2023-11-29 RX ORDER — CHLORHEXIDINE GLUCONATE 1.2 MG/ML
AS DIRECTED RINSE ORAL
Status: ON HOLD | COMMUNITY

## 2023-11-29 RX ORDER — URSODIOL 250 MG/1
1 TABLET WITH FOOD TABLET ORAL TWICE A DAY
Status: ON HOLD | COMMUNITY

## 2023-11-29 RX ORDER — METOCLOPRAMIDE 10 MG/1
1 TABLET BEFORE MEALS TABLET ORAL TWICE A DAY
Status: ON HOLD | COMMUNITY

## 2023-11-29 NOTE — HPI-TODAY'S VISIT:
This is a 48-year-old with a history of anxiety, depression, GERD, nausea and vomiting, constipation, and epigastric pain presenting for follow-up. She was last seen 8/10/2021.  It was discussed that her symptoms are chronic and likely multifactorial associated with a functional GI disorder and GI dysmotility associated with opioid use.  She had started BuSpar 5 mg twice a day at a prior visit.  This was increased to 10 mg twice a day.  It was discussed that she needed to stop all pain medication.  She was to continue Linzess 145 mcg daily for constipation and pantoprazole 40 mg daily for acid reflux.